# Patient Record
Sex: MALE | Race: OTHER | NOT HISPANIC OR LATINO | Employment: UNEMPLOYED | URBAN - METROPOLITAN AREA
[De-identification: names, ages, dates, MRNs, and addresses within clinical notes are randomized per-mention and may not be internally consistent; named-entity substitution may affect disease eponyms.]

---
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
## 2023-11-14 ENCOUNTER — HOSPITAL ENCOUNTER (EMERGENCY)
Facility: HOSPITAL | Age: 23
Discharge: HOME | End: 2023-11-14
Attending: EMERGENCY MEDICINE

## 2023-11-14 VITALS
TEMPERATURE: 98 F | DIASTOLIC BLOOD PRESSURE: 68 MMHG | SYSTOLIC BLOOD PRESSURE: 122 MMHG | HEART RATE: 69 BPM | OXYGEN SATURATION: 100 % | WEIGHT: 216.06 LBS | RESPIRATION RATE: 16 BRPM

## 2023-11-14 DIAGNOSIS — L02.212 ABSCESS OF BACK: ICD-10-CM

## 2023-11-14 DIAGNOSIS — L29.9 ITCHING: Primary | ICD-10-CM

## 2023-11-14 DIAGNOSIS — R21 RASH AND NONSPECIFIC SKIN ERUPTION: ICD-10-CM

## 2023-11-14 PROCEDURE — 99284 EMERGENCY DEPT VISIT MOD MDM: CPT

## 2023-11-14 PROCEDURE — 25000003 PHARM REV CODE 250

## 2023-11-14 RX ORDER — LORATADINE 10 MG/1
10 TABLET ORAL DAILY
Qty: 60 TABLET | Refills: 0 | Status: SHIPPED | OUTPATIENT
Start: 2023-11-14 | End: 2024-11-13

## 2023-11-14 RX ORDER — LIDOCAINE HYDROCHLORIDE 10 MG/ML
5 INJECTION, SOLUTION EPIDURAL; INFILTRATION; INTRACAUDAL; PERINEURAL
Status: DISCONTINUED | OUTPATIENT
Start: 2023-11-14 | End: 2023-11-14 | Stop reason: HOSPADM

## 2023-11-14 RX ORDER — DIPHENHYDRAMINE HCL 50 MG
50 CAPSULE ORAL EVERY 6 HOURS PRN
Qty: 20 CAPSULE | Refills: 0 | Status: SHIPPED | OUTPATIENT
Start: 2023-11-14

## 2023-11-14 RX ORDER — CEPHALEXIN 500 MG/1
500 CAPSULE ORAL 4 TIMES DAILY
Qty: 20 CAPSULE | Refills: 0 | Status: SHIPPED | OUTPATIENT
Start: 2023-11-14 | End: 2023-11-19

## 2023-11-14 RX ORDER — FAMOTIDINE 20 MG/1
40 TABLET, FILM COATED ORAL
Status: COMPLETED | OUTPATIENT
Start: 2023-11-14 | End: 2023-11-14

## 2023-11-14 RX ADMIN — FAMOTIDINE 40 MG: 20 TABLET ORAL at 05:11

## 2023-11-14 NOTE — ED NOTES
Patient presents to the ED with c/o itching all over his body x a couple weeks. States he did change soaps but states it is not one he hasn't used in the past. Denies new medications. Denies OTC creams or antihistamines. No rash present. Patient does have large boil noted to upper middle back. States this is painful and noted 2 days ago. Endorses some drainage. I & D tray set up at bedside. Awaiting further orders.

## 2023-11-14 NOTE — ED TRIAGE NOTES
C/o generalized itching all over for past 2 days. Denies taking OTC medications or creams. Acknowledges using different cleaning products recently Denies rash/insect bites. No SOB. Presents in no distress.

## 2023-11-15 NOTE — ED PROVIDER NOTES
Encounter Date: 11/14/2023       History     Chief Complaint   Patient presents with    Itching     Itching all over the body for several days. No urticaria or rash noted.      [age] year old male with no documented medical history presents to the ED with generalized itching for about 1 week now.  He has not tried any medications and takes no daily medications.  He reports in the last 2 weeks he has changed both body wash and shampoo.  He has also had exposure to puppies over the last 2 weeks. No known food allergies. He does reports some sneezing at baseline, but no significant increase.  His eyes itch as well. Some papular rash to lower extremities. No household contacts with similar symptoms. He also has boil to left middle back for two days. Some spontaneous purulent discharge. Some erythema. Reports he gets these frequently that typically resolve spontaneously. No fever, CP, SOB, N/V, abdominal pain, diarrhea.    The history is provided by the patient.     Review of patient's allergies indicates:  No Known Allergies  No past medical history on file.  No past surgical history on file.  No family history on file.     Review of Systems   Constitutional:  Negative for chills and fever.   HENT:  Negative for congestion.    Eyes:  Positive for itching. Negative for redness.   Respiratory:  Negative for cough, shortness of breath and wheezing.    Cardiovascular:  Negative for chest pain.   Gastrointestinal:  Negative for abdominal pain, diarrhea, nausea and vomiting.   Skin:  Positive for color change and rash.        Itching     Neurological:  Negative for headaches.   Psychiatric/Behavioral:  Negative for agitation and confusion.        Physical Exam     Initial Vitals [11/14/23 1615]   BP Pulse Resp Temp SpO2   (!) 126/59 76 18 97.5 °F (36.4 °C) 98 %      MAP       --         Physical Exam    Nursing note and vitals reviewed.  Constitutional: He appears well-developed and well-nourished. He is not diaphoretic.   Non-toxic appearance. No distress.   HENT:   Head: Normocephalic and atraumatic.   Right Ear: External ear normal.   Left Ear: External ear normal.   Mouth/Throat: Oropharynx is clear and moist.   Eyes: Conjunctivae and EOM are normal. Pupils are equal, round, and reactive to light. Right eye exhibits no discharge. Left eye exhibits no discharge.   Neck: Neck supple.   Normal range of motion.  Cardiovascular:  Normal rate and regular rhythm.           Pulmonary/Chest: Breath sounds normal. No respiratory distress.   Abdominal: He exhibits no distension.   Musculoskeletal:         General: Normal range of motion.      Cervical back: Normal range of motion and neck supple.     Neurological: He is alert and oriented to person, place, and time. GCS score is 15. GCS eye subscore is 4. GCS verbal subscore is 5. GCS motor subscore is 6.   Skin: Skin is dry.   Sparse papular rash to upper thighs. No petechiae, vesicles, sloughing, crusting. No mucous membrane involvement. No interdigital burrows/lesions. Does not appear fungal/scabies.     3cm area of induration to left sided middle back with erythema warmth.  No purulence with expression of wound. Diffuse scarring to back from prior acne/boils.   Psychiatric: He has a normal mood and affect. His behavior is normal. Judgment and thought content normal.         ED Course   Procedures  Labs Reviewed - No data to display       Imaging Results    None          Medications   famotidine tablet 40 mg (40 mg Oral Given 11/14/23 1736)     Medical Decision Making  24 yo male with generalized itching for about one week. Recently changed shampoo and body wash. Also new exposure to puppies in last two weeks. Associated sneezing and ocular pruritus.     After complete evaluation, including thorough history and physical exam, the patient's symptoms are most consistent with allergic dermatitis. Remove offending new agents, shampoo and body wash. Hx of sensitive skin. Switch to  hypoallergic, unscented hygiene product. Avoid close contact with puppies as could also be contributing. Claritin daily and benadryl as needed. There are no concerning features to suggest acute infection, cellulitis, abscess, or necrotizing fasciitis. There is no diffuse skin or mucous membrane involvement to suggest TEN/SJS. The appearance does not suggest Lyme/tick disease, syphilis, scabies/bedbugs, or fungal infection.    Will additionally place patient on antibiotics for abscess to back. Abscess is indurated. Do not feel I&D is indicated today. Encouraged warm compress. Monitor for any worsening signs of infection. PCP referral placed.    At this time, I feel there is no emergent condition requiring admission or further testing. Findings and clinical impression discussed with patient. Questions were answered, and patient stated understanding. Patient instructed to follow-up with their PCP or the one provided in 2-3 days. Strict return precautions were discussed, including new or worsening symptoms.      Risk  OTC drugs.  Prescription drug management.                               Clinical Impression:   Final diagnoses:  [L29.9] Itching (Primary)  [R21] Rash and nonspecific skin eruption  [L02.212] Abscess of back        ED Disposition Condition    Discharge Stable          ED Prescriptions       Medication Sig Dispense Start Date End Date Auth. Provider    loratadine (CLARITIN) 10 mg tablet Take 1 tablet (10 mg total) by mouth once daily. 60 tablet 11/14/2023 11/13/2024 Ashley Amaya PA-C    diphenhydrAMINE (BENADRYL) 50 MG capsule Take 1 capsule (50 mg total) by mouth every 6 (six) hours as needed for Itching or Allergies. 20 capsule 11/14/2023 -- Ashley Amaya PA-C    cephALEXin (KEFLEX) 500 MG capsule Take 1 capsule (500 mg total) by mouth 4 (four) times daily. for 5 days 20 capsule 11/14/2023 11/19/2023 Ashley Amaya PA-C          Follow-up Information       Follow up With  Specialties Details Why Contact Info Additional Information    [facility] Family Medicine Schedule an appointment as soon as possible for a visit in 2 days  [address], [address]  [facility] [address] [phone] Please park in [address] or [address] and use [address]. Take Medical Office Bldg. elevators.             Ashley Amaya PA-C  11/15/23 2554

## 2023-11-15 NOTE — DISCHARGE INSTRUCTIONS
Use hypoallergenic non scented body wash and shampoo. Remove any new products you started using in the past two weeks. Avoid contact with the puppies to see if symptoms improve.    Take claritin daily. Take benadryl as needed for itching.     Return to the emergency room if you develop any shortness a breath, chest pain, nausea, vomiting, diarrhea, worsening redness to the boil on your back, any new concerning symptoms.    Start taking antibiotics as prescribed, and continue taking medication until the entire prescription has been completed.  Avoid using drugs/alcohol while on antibiotics, and for the next 72 hours after finishing the prescription.  Obtain an appointment or return to the ED for any symptoms of worsening infection, including fever, nausea/vomiting or inability to take the medication, antibiotic side effects, allergic reaction, or any other concerns.

## 2023-11-17 ENCOUNTER — LAB VISIT (OUTPATIENT)
Dept: LAB | Facility: HOSPITAL | Age: 23
End: 2023-11-17
Attending: INTERNAL MEDICINE

## 2023-11-17 DIAGNOSIS — Z11.3 SCREENING EXAMINATION FOR VENEREAL DISEASE: ICD-10-CM

## 2023-11-17 DIAGNOSIS — E78.2 MIXED HYPERLIPIDEMIA: ICD-10-CM

## 2023-11-17 LAB
ALBUMIN SERPL BCP-MCNC: 4.7 G/DL (ref 3.5–5.2)
ALP SERPL-CCNC: 58 U/L (ref 55–135)
ALT SERPL W/O P-5'-P-CCNC: 13 U/L (ref 10–44)
ANION GAP SERPL CALC-SCNC: 11 MMOL/L (ref 8–16)
AST SERPL-CCNC: 17 U/L (ref 10–40)
BILIRUB SERPL-MCNC: 0.5 MG/DL (ref 0.1–1)
BUN SERPL-MCNC: 11 MG/DL (ref 6–20)
CALCIUM SERPL-MCNC: 10 MG/DL (ref 8.7–10.5)
CHLORIDE SERPL-SCNC: 106 MMOL/L (ref 95–110)
CO2 SERPL-SCNC: 25 MMOL/L (ref 23–29)
CREAT SERPL-MCNC: 0.9 MG/DL (ref 0.5–1.4)
EST. GFR  (NO RACE VARIABLE): >60 ML/MIN/1.73 M^2
GLUCOSE SERPL-MCNC: 85 MG/DL (ref 70–110)
POTASSIUM SERPL-SCNC: 4.3 MMOL/L (ref 3.5–5.1)
PROT SERPL-MCNC: 8 G/DL (ref 6–8.4)
SODIUM SERPL-SCNC: 142 MMOL/L (ref 136–145)

## 2023-11-17 PROCEDURE — 87491 CHLMYD TRACH DNA AMP PROBE: CPT | Performed by: INTERNAL MEDICINE

## 2023-11-17 PROCEDURE — 80053 COMPREHEN METABOLIC PANEL: CPT | Performed by: INTERNAL MEDICINE

## 2023-11-17 PROCEDURE — 36415 COLL VENOUS BLD VENIPUNCTURE: CPT | Performed by: INTERNAL MEDICINE

## 2023-11-18 LAB
C TRACH DNA SPEC QL NAA+PROBE: NOT DETECTED
N GONORRHOEA DNA SPEC QL NAA+PROBE: NOT DETECTED